# Patient Record
Sex: MALE | Race: WHITE | Employment: FULL TIME | URBAN - METROPOLITAN AREA
[De-identification: names, ages, dates, MRNs, and addresses within clinical notes are randomized per-mention and may not be internally consistent; named-entity substitution may affect disease eponyms.]

---

## 2024-06-05 ENCOUNTER — HOSPITAL ENCOUNTER (INPATIENT)
Age: 54
LOS: 2 days | Discharge: HOME | End: 2024-06-07
Attending: EMERGENCY MEDICINE | Admitting: INTERNAL MEDICINE
Payer: COMMERCIAL

## 2024-06-05 ENCOUNTER — APPOINTMENT (OUTPATIENT)
Dept: GENERAL RADIOLOGY | Age: 54
End: 2024-06-05
Payer: COMMERCIAL

## 2024-06-05 ENCOUNTER — APPOINTMENT (OUTPATIENT)
Dept: CT IMAGING | Age: 54
End: 2024-06-05
Payer: COMMERCIAL

## 2024-06-05 DIAGNOSIS — J20.9 ACUTE BRONCHITIS, UNSPECIFIED ORGANISM: Primary | ICD-10-CM

## 2024-06-05 DIAGNOSIS — R09.02 HYPOXEMIA: ICD-10-CM

## 2024-06-05 PROBLEM — J44.1 ACUTE EXACERBATION OF CHRONIC OBSTRUCTIVE PULMONARY DISEASE (COPD) (HCC): Status: ACTIVE | Noted: 2024-06-05

## 2024-06-05 LAB
ALBUMIN SERPL-MCNC: 4.9 G/DL (ref 3.4–5)
ALBUMIN/GLOB SERPL: 1.6 {RATIO} (ref 1.1–2.2)
ALP SERPL-CCNC: 71 U/L (ref 40–129)
ALT SERPL-CCNC: 24 U/L (ref 10–40)
ANION GAP SERPL CALCULATED.3IONS-SCNC: 12 MMOL/L (ref 3–16)
AST SERPL-CCNC: 23 U/L (ref 15–37)
BASE EXCESS BLDA CALC-SCNC: -2.3 MMOL/L (ref -3–3)
BASOPHILS # BLD: 0.1 K/UL (ref 0–0.2)
BASOPHILS NFR BLD: 0.5 %
BILIRUB SERPL-MCNC: 0.5 MG/DL (ref 0–1)
BUN SERPL-MCNC: 20 MG/DL (ref 7–20)
CALCIUM SERPL-MCNC: 10.2 MG/DL (ref 8.3–10.6)
CHLORIDE SERPL-SCNC: 102 MMOL/L (ref 99–110)
CO2 BLDA-SCNC: 49.7 MMOL/L
CO2 SERPL-SCNC: 26 MMOL/L (ref 21–32)
COHGB MFR BLDA: 1.6 % (ref 0–1.5)
CREAT SERPL-MCNC: 0.9 MG/DL (ref 0.9–1.3)
DEPRECATED RDW RBC AUTO: 13.6 % (ref 12.4–15.4)
EOSINOPHIL # BLD: 0.2 K/UL (ref 0–0.6)
EOSINOPHIL NFR BLD: 1.7 %
FLUAV RNA RESP QL NAA+PROBE: NOT DETECTED
FLUBV RNA RESP QL NAA+PROBE: NOT DETECTED
GFR SERPLBLD CREATININE-BSD FMLA CKD-EPI: >90 ML/MIN/{1.73_M2}
GLUCOSE SERPL-MCNC: 80 MG/DL (ref 70–99)
HCO3 BLDA-SCNC: 21.2 MMOL/L (ref 21–29)
HCT VFR BLD AUTO: 47.4 % (ref 40.5–52.5)
HGB BLD-MCNC: 16.2 G/DL (ref 13.5–17.5)
HGB BLDA-MCNC: 15.6 G/DL (ref 13.5–17.5)
INR PPP: 0.95 (ref 0.85–1.15)
LACTATE BLDV-SCNC: 1 MMOL/L (ref 0.4–1.9)
LYMPHOCYTES # BLD: 2.4 K/UL (ref 1–5.1)
LYMPHOCYTES NFR BLD: 24.8 %
MCH RBC QN AUTO: 31.8 PG (ref 26–34)
MCHC RBC AUTO-ENTMCNC: 34.1 G/DL (ref 31–36)
MCV RBC AUTO: 93.1 FL (ref 80–100)
METHGB MFR BLDA: 0.5 %
MONOCYTES # BLD: 1.1 K/UL (ref 0–1.3)
MONOCYTES NFR BLD: 11.1 %
NEUTROPHILS # BLD: 5.9 K/UL (ref 1.7–7.7)
NEUTROPHILS NFR BLD: 61.9 %
NT-PROBNP SERPL-MCNC: 136 PG/ML (ref 0–124)
O2 THERAPY: ABNORMAL
PCO2 BLDA: 32.4 MMHG (ref 35–45)
PH BLDA: 7.42 [PH] (ref 7.35–7.45)
PLATELET # BLD AUTO: 222 K/UL (ref 135–450)
PMV BLD AUTO: 8.2 FL (ref 5–10.5)
PO2 BLDA: 70.9 MMHG (ref 75–108)
POTASSIUM SERPL-SCNC: 4.1 MMOL/L (ref 3.5–5.1)
PROCALCITONIN SERPL IA-MCNC: 0.07 NG/ML (ref 0–0.15)
PROT SERPL-MCNC: 8 G/DL (ref 6.4–8.2)
PROTHROMBIN TIME: 12.9 SEC (ref 11.9–14.9)
RBC # BLD AUTO: 5.09 M/UL (ref 4.2–5.9)
SAO2 % BLDA: 95.3 %
SARS-COV-2 RNA RESP QL NAA+PROBE: NOT DETECTED
SODIUM SERPL-SCNC: 140 MMOL/L (ref 136–145)
TROPONIN, HIGH SENSITIVITY: 6 NG/L (ref 0–22)
TROPONIN, HIGH SENSITIVITY: 6 NG/L (ref 0–22)
WBC # BLD AUTO: 9.5 K/UL (ref 4–11)

## 2024-06-05 PROCEDURE — 87636 SARSCOV2 & INF A&B AMP PRB: CPT

## 2024-06-05 PROCEDURE — 96374 THER/PROPH/DIAG INJ IV PUSH: CPT

## 2024-06-05 PROCEDURE — 87040 BLOOD CULTURE FOR BACTERIA: CPT

## 2024-06-05 PROCEDURE — 85025 COMPLETE CBC W/AUTO DIFF WBC: CPT

## 2024-06-05 PROCEDURE — 94640 AIRWAY INHALATION TREATMENT: CPT

## 2024-06-05 PROCEDURE — 6370000000 HC RX 637 (ALT 250 FOR IP): Performed by: INTERNAL MEDICINE

## 2024-06-05 PROCEDURE — 2580000003 HC RX 258: Performed by: INTERNAL MEDICINE

## 2024-06-05 PROCEDURE — 1200000000 HC SEMI PRIVATE

## 2024-06-05 PROCEDURE — 2700000000 HC OXYGEN THERAPY PER DAY

## 2024-06-05 PROCEDURE — 71045 X-RAY EXAM CHEST 1 VIEW: CPT

## 2024-06-05 PROCEDURE — 85610 PROTHROMBIN TIME: CPT

## 2024-06-05 PROCEDURE — 6360000004 HC RX CONTRAST MEDICATION: Performed by: EMERGENCY MEDICINE

## 2024-06-05 PROCEDURE — 80053 COMPREHEN METABOLIC PANEL: CPT

## 2024-06-05 PROCEDURE — 83880 ASSAY OF NATRIURETIC PEPTIDE: CPT

## 2024-06-05 PROCEDURE — 6370000000 HC RX 637 (ALT 250 FOR IP): Performed by: EMERGENCY MEDICINE

## 2024-06-05 PROCEDURE — 2580000003 HC RX 258: Performed by: EMERGENCY MEDICINE

## 2024-06-05 PROCEDURE — 6360000002 HC RX W HCPCS: Performed by: INTERNAL MEDICINE

## 2024-06-05 PROCEDURE — 82803 BLOOD GASES ANY COMBINATION: CPT

## 2024-06-05 PROCEDURE — 71260 CT THORAX DX C+: CPT

## 2024-06-05 PROCEDURE — 94761 N-INVAS EAR/PLS OXIMETRY MLT: CPT

## 2024-06-05 PROCEDURE — 83605 ASSAY OF LACTIC ACID: CPT

## 2024-06-05 PROCEDURE — 36415 COLL VENOUS BLD VENIPUNCTURE: CPT

## 2024-06-05 PROCEDURE — 84145 PROCALCITONIN (PCT): CPT

## 2024-06-05 PROCEDURE — 84484 ASSAY OF TROPONIN QUANT: CPT

## 2024-06-05 PROCEDURE — 6360000002 HC RX W HCPCS: Performed by: EMERGENCY MEDICINE

## 2024-06-05 PROCEDURE — 99285 EMERGENCY DEPT VISIT HI MDM: CPT

## 2024-06-05 RX ORDER — ACETAMINOPHEN 650 MG/1
650 SUPPOSITORY RECTAL EVERY 6 HOURS PRN
Status: DISCONTINUED | OUTPATIENT
Start: 2024-06-05 | End: 2024-06-07 | Stop reason: HOSPADM

## 2024-06-05 RX ORDER — IPRATROPIUM BROMIDE AND ALBUTEROL SULFATE 2.5; .5 MG/3ML; MG/3ML
1 SOLUTION RESPIRATORY (INHALATION)
Status: DISCONTINUED | OUTPATIENT
Start: 2024-06-05 | End: 2024-06-07 | Stop reason: HOSPADM

## 2024-06-05 RX ORDER — SODIUM CHLORIDE 9 MG/ML
INJECTION, SOLUTION INTRAVENOUS PRN
Status: DISCONTINUED | OUTPATIENT
Start: 2024-06-05 | End: 2024-06-07 | Stop reason: HOSPADM

## 2024-06-05 RX ORDER — AZITHROMYCIN 250 MG/1
250 TABLET, FILM COATED ORAL DAILY
Status: DISCONTINUED | OUTPATIENT
Start: 2024-06-05 | End: 2024-06-07 | Stop reason: HOSPADM

## 2024-06-05 RX ORDER — IPRATROPIUM BROMIDE AND ALBUTEROL SULFATE 2.5; .5 MG/3ML; MG/3ML
1 SOLUTION RESPIRATORY (INHALATION)
Status: DISCONTINUED | OUTPATIENT
Start: 2024-06-05 | End: 2024-06-05

## 2024-06-05 RX ORDER — POLYETHYLENE GLYCOL 3350 17 G/17G
17 POWDER, FOR SOLUTION ORAL DAILY PRN
Status: DISCONTINUED | OUTPATIENT
Start: 2024-06-05 | End: 2024-06-07 | Stop reason: HOSPADM

## 2024-06-05 RX ORDER — DOXYCYCLINE HYCLATE 100 MG
100 TABLET ORAL DAILY
Status: ON HOLD | COMMUNITY
End: 2024-06-07 | Stop reason: HOSPADM

## 2024-06-05 RX ORDER — SILDENAFIL 100 MG/1
100 TABLET, FILM COATED ORAL PRN
COMMUNITY

## 2024-06-05 RX ORDER — SODIUM CHLORIDE 0.9 % (FLUSH) 0.9 %
5-40 SYRINGE (ML) INJECTION PRN
Status: DISCONTINUED | OUTPATIENT
Start: 2024-06-05 | End: 2024-06-07 | Stop reason: HOSPADM

## 2024-06-05 RX ORDER — IPRATROPIUM BROMIDE AND ALBUTEROL SULFATE 2.5; .5 MG/3ML; MG/3ML
3 SOLUTION RESPIRATORY (INHALATION) ONCE
Status: COMPLETED | OUTPATIENT
Start: 2024-06-05 | End: 2024-06-05

## 2024-06-05 RX ORDER — ENOXAPARIN SODIUM 100 MG/ML
40 INJECTION SUBCUTANEOUS DAILY
Status: DISCONTINUED | OUTPATIENT
Start: 2024-06-05 | End: 2024-06-07 | Stop reason: HOSPADM

## 2024-06-05 RX ORDER — SODIUM CHLORIDE 0.9 % (FLUSH) 0.9 %
5-40 SYRINGE (ML) INJECTION EVERY 12 HOURS SCHEDULED
Status: DISCONTINUED | OUTPATIENT
Start: 2024-06-05 | End: 2024-06-07 | Stop reason: HOSPADM

## 2024-06-05 RX ORDER — ACETAMINOPHEN 325 MG/1
650 TABLET ORAL EVERY 6 HOURS PRN
Status: DISCONTINUED | OUTPATIENT
Start: 2024-06-05 | End: 2024-06-07 | Stop reason: HOSPADM

## 2024-06-05 RX ORDER — NICOTINE 21 MG/24HR
1 PATCH, TRANSDERMAL 24 HOURS TRANSDERMAL DAILY
Status: DISCONTINUED | OUTPATIENT
Start: 2024-06-05 | End: 2024-06-07 | Stop reason: HOSPADM

## 2024-06-05 RX ADMIN — IPRATROPIUM BROMIDE AND ALBUTEROL SULFATE 1 DOSE: .5; 3 SOLUTION RESPIRATORY (INHALATION) at 19:41

## 2024-06-05 RX ADMIN — IPRATROPIUM BROMIDE AND ALBUTEROL SULFATE 3 DOSE: .5; 3 SOLUTION RESPIRATORY (INHALATION) at 10:23

## 2024-06-05 RX ADMIN — WATER 125 MG: 1 INJECTION INTRAMUSCULAR; INTRAVENOUS; SUBCUTANEOUS at 09:24

## 2024-06-05 RX ADMIN — WATER 40 MG: 1 INJECTION INTRAMUSCULAR; INTRAVENOUS; SUBCUTANEOUS at 14:43

## 2024-06-05 RX ADMIN — AZITHROMYCIN DIHYDRATE 250 MG: 250 TABLET ORAL at 14:43

## 2024-06-05 RX ADMIN — IPRATROPIUM BROMIDE AND ALBUTEROL SULFATE 1 DOSE: .5; 3 SOLUTION RESPIRATORY (INHALATION) at 16:28

## 2024-06-05 RX ADMIN — Medication 2 PUFF: at 19:41

## 2024-06-05 RX ADMIN — SODIUM CHLORIDE, PRESERVATIVE FREE 10 ML: 5 INJECTION INTRAVENOUS at 22:47

## 2024-06-05 RX ADMIN — IOPAMIDOL 75 ML: 755 INJECTION, SOLUTION INTRAVENOUS at 11:03

## 2024-06-05 RX ADMIN — ENOXAPARIN SODIUM 40 MG: 100 INJECTION SUBCUTANEOUS at 14:43

## 2024-06-05 ASSESSMENT — LIFESTYLE VARIABLES
HOW MANY STANDARD DRINKS CONTAINING ALCOHOL DO YOU HAVE ON A TYPICAL DAY: 3 OR 4
HOW OFTEN DO YOU HAVE A DRINK CONTAINING ALCOHOL: 2-3 TIMES A WEEK

## 2024-06-05 NOTE — PROGRESS NOTES
Patient seen in ED, room 20.  Admission completed with the following exceptions:  4 Eyes Assessment, Immunizations, Vaccines, Rights and Responsibilities, Orientation to room, Plan of Care, Education/Learning Assessment and Education Plan, white board, height and weight, pain assessment and head to toe assessment.  Patient is alert and oriented X 4.  Patient lives in a split level house with his wife and a friend and is being admitted for cough.  Home Medications as well as Outside Sources have been verbally reviewed with patient and updated if appropriate.  Medication reconciliation is now Completed.  All questions answered.    Spiritual Care consult entered as patient would like information on Advance Directives.

## 2024-06-05 NOTE — CARE COORDINATION
Discharge Planning Note:    Chart reviewed and it appears that patient has minimal needs for discharge at this time.     Primary Care Physician is Willard Gonzales MD    Primary insurance is [employer]     Please notify case management if any discharge needs are identified.      Case management will continue to follow progress and update discharge plan as needed.     Electronically signed by CORIE Velez on 6/5/2024 at 2:49 PM

## 2024-06-05 NOTE — PROGRESS NOTES
Patient admitted for Respiratory distress due to a Respiratory illness, aerosolized medication frequency changed to Q4H x 24 hours, patient to be reassessed the next day.   Electronically signed by JULIEN Rodriguez RCP on 6/5/24 at 4:32 PM EDT

## 2024-06-05 NOTE — ACP (ADVANCE CARE PLANNING)
Advanced Care Planning Note.    Purpose of Encounter: Advanced care planning in light of hospitalization  Parties In Attendance: Patient,    Decisional Capacity: Yes  Subjective: Patient  understand that this conversation is to address long term care goal  Objective: Patient admitted to the hospital with acute COPD exacerbation  Goals of Care Determination: Patient would pursue CPR and Intubation if required. No tracheostomy or long term ventilation if required.     Code Status: full code  Time spent on Advanced care Plannin minutes  Advanced Care Planning Documents: documented patient's wishes, would like Wife Agustín to make medical decisions if unable to make decisions    Elisa Rinaldi MD  2024 1:50 PM

## 2024-06-05 NOTE — ED NOTES
How does patient ambulate?   [x]Low Fall Risk (ambulates by themselves without support)  []Stand by assist   []Contact Guard   []Front wheel walker  []Wheelchair   []Steady  []Bed bound  []History of Lower Extremity Amputation  []Unknown, did not assess in the emergency department   How does patient take pills?  [x]Whole with Water  []Crushed in applesauce  []Crushed in pudding  []Other  []Unknown no oral medications were given in the ED  Is patient alert?   [x]Alert  []Drowsy but responds to voice  []Doesn't respond to voice but responds to painful stimuli  []Unresponsive  Is patient oriented?   [x]To person  [x]To place  [x]To time  [x]To situation  []Confused  []Agitated  [x]Follows commands  If patient is disoriented or from a Skill Nursing Facility has family been notified of admission?   []Yes   []No  NA  Patient belongings?   []Cell phone  []Wallet   []Dentures  []Clothing  Any specific patient or family belongings/needs/dynamics?   -  Miscellaneous comments/pending orders?  All ED orders completed.      If there are any additional questions please reach out to the Emergency Department.

## 2024-06-05 NOTE — ED PROVIDER NOTES
limited to: acute coronary syndrome, pulmonary embolism, COPD/asthma, pneumonia, sepsis, pericardial tamponade, pneumothorax, CHF, thoracic aortic dissection, anxiety    The patient's ED workup was notable for concern for acute hypoxic respiratory failure, stable on nasal cannula oxygen.  Infectious causes were highest on the differential based on his presentation.  COVID and flu are both negative, no leukocytosis or fever, procalcitonin is low, chest x-ray shows no infiltrate and lactate is normal.  The patient does not meet sepsis criteria for these reasons and does not have a definitive bacterial infection so antibiotics are deferred.  Subsequent CT chest was obtained given dyspnea out of proportion to chest x-ray and other findings and this ruled out PE or confluent infiltrate or edema.  As such we will treat as bronchitis likely attributable to his heavy smoking history although has never been formally diagnosed with COPD.  He did receive multiple breathing treatments and IV Solu-Medrol with some improvement and stable on nasal cannula oxygen.  Will admit due to new oxygen requirement.    Is this patient to be included in the SEP-1 Core Measure?  No   Exclusion criteria - the patient is NOT to be included for SEP-1 Core Measure due to:  Viral etiology found or highly suspected (including COVID-19) without concomitant bacterial infection      Consults:  Hospitalist service consulted for admission    History obtained from: Patient    Pertinent social determinants of health: None applicable    Chronic conditions potentially affecting care:  smoker    Review of other records:  None    Reassessment:  See MDM for details of medications given and reassessment    During the patient's ED course, the patient was given:  Medications   azithromycin (ZITHROMAX) tablet 250 mg (has no administration in time range)   methylPREDNISolone sodium succ (SOLU-MEDROL) 125 mg in sterile water 2 mL injection (125 mg IntraVENous Given  6/5/24 6268)   ipratropium 0.5 mg-albuterol 2.5 mg (DUONEB) nebulizer solution 3 Dose (3 Doses Inhalation Given 6/5/24 1023)   iopamidol (ISOVUE-370) 76 % injection 75 mL (75 mLs IntraVENous Given 6/5/24 1103)        CLINICAL IMPRESSION  1. Acute bronchitis, unspecified organism    2. Hypoxemia        Blood pressure 133/72, pulse 84, temperature 97.8 °F (36.6 °C), temperature source Oral, resp. rate 15, height 1.778 m (5' 10\"), weight 79.2 kg (174 lb 11.2 oz), SpO2 92 %.    DISPOSITION  Kenny Lanier was admitted in fair condition.    The plan is to admit to the hospital at this time under the hospitalist service.  Hospitalist accepted the patient and will take over the patient's care.    Critical care time:  The total critical care time I independently spent while evaluating and treating this patient was 34 minutes.  This excludes time spent doing separately billable procedures.  This includes time at the bedside, data interpretation, medication management, obtaining critical history from collateral sources if the patient is unable to provide it directly, and physician consultation.  Specifics of interventions taken and potentially life-threatening diagnostic considerations are listed above in the medical decision making.  If this was a shared visit with an CROW, the time in this attestation is non-concurrent critical care time out of the total shared critical care time provided by the CROW and myself.    DISCLAIMER: This chart was created using Dragon dictation software.  Efforts were made by me to ensure accuracy, however some errors may be present due to limitations of this technology and occasionally words are not transcribed correctly.        Braulio De La Vega MD  06/05/24 6731

## 2024-06-05 NOTE — H&P
HOSPITALISTS HISTORY AND PHYSICAL    6/5/2024 1:48 PM    Patient Information:  TERENCE LANIER is a 53 y.o. male [id number]  PCP:  Willard Gonzales MD (Tel: [phone] )    Chief complaint:    Chief Complaint   Patient presents with    Cough     Patient states chest cold and cough since Friday, states productive cough, 89-90% on RA at time of triage, feels like he's getting worse.        History of Present Illness:  Terence Lanier is a 53 y.o. male sent with cooperative green sputum along with chills and sweats no fever is also having shortness of breath thus came to the ED denies any sick contacts nausea vomiting or diarrhea patient does smoke 1 pack/day and drinks on the weekends does not have a diagnosis of COPD or asthma in the past  REVIEW OF SYSTEMS:   ENT: Negative for rhinorrhea, epistaxis, hoarseness, sore throat.     Gastrointestinal: Negative for nausea, vomiting, diarrhea  Genitourinary: Negative for polyuria, dysuria   Hematologic/Lymphatic: Negative for bleeding tendency, easy bruising  Musculoskeletal: Negative for myalgias and arthralgias  Neurologic: Negative for confusion,dysarthria.  Skin: Negative for itching,rash  Psychiatric: Negative for depression,anxiety, agitation.  Endocrine: Negative for polydipsia,polyuria,heat /cold intolerance.    Past Medical History:   has a past medical history of Acne rosacea and Depression.     Past Surgical History:   has no past surgical history on file.     Medications:  No current facility-administered medications on file prior to encounter.     Current Outpatient Medications on File Prior to Encounter   Medication Sig Dispense Refill    doxycycline hyclate (VIBRA-TABS) 100 MG tablet Take 1 tablet by mouth daily      sildenafil (VIAGRA) 100 MG tablet Take 1 tablet by mouth as needed for Erectile Dysfunction      cephALEXin (KEFLEX) 500 MG capsule Take 1 capsule by mouth 4  times daily (Patient not taking: Reported on 6/5/2024) 40 capsule 0       Allergies:  No Known Allergies     Social History:  Patient Lives att home   reports that he has been smoking cigarettes. He has never used smokeless tobacco. He reports current alcohol use. He reports current drug use. Frequency: 2.00 times per week. Drug: Marijuana (Weed).     Family History:  family history includes Cancer in his father and mother. ,     Physical Exam:  /72   Pulse 84   Temp 97.8 °F (36.6 °C) (Oral)   Resp 15   Ht 1.778 m (5' 10\")   Wt 79.2 kg (174 lb 11.2 oz)   SpO2 92%   BMI 25.07 kg/m²     General appearance:  Appears comfortable. AAOx3  HEENT: atraumatic, Pupils equal, muscous membranes moist, no masses appreciated  Cardiovascular: Regular rate and rhythm no murmurs appreciated  Respiratory:+ wheezing  Gastrointestinal: Abdomen soft, non-tender, BS+  EXT: no edema  Neurology: no gross focal deficts  Psychiatry: Appropriate affect. Not agitated  Skin: Warm, dry, no rashes appreciated    Labs:  CBC:   Lab Results   Component Value Date/Time    WBC 9.5 06/05/2024 09:28 AM    RBC 5.09 06/05/2024 09:28 AM    HGB 16.2 06/05/2024 09:28 AM    HCT 47.4 06/05/2024 09:28 AM    MCV 93.1 06/05/2024 09:28 AM    MCH 31.8 06/05/2024 09:28 AM    MCHC 34.1 06/05/2024 09:28 AM    RDW 13.6 06/05/2024 09:28 AM     06/05/2024 09:28 AM    MPV 8.2 06/05/2024 09:28 AM     BMP:    Lab Results   Component Value Date/Time     06/05/2024 09:28 AM    K 4.1 06/05/2024 09:28 AM     06/05/2024 09:28 AM    CO2 26 06/05/2024 09:28 AM    BUN 20 06/05/2024 09:28 AM    CREATININE 0.9 06/05/2024 09:28 AM    CALCIUM 10.2 06/05/2024 09:28 AM    LABGLOM >90 06/05/2024 09:28 AM    GLUCOSE 80 06/05/2024 09:28 AM     CT CHEST PULMONARY EMBOLISM W CONTRAST   Final Result   No evidence of pulmonary embolism or acute pulmonary abnormality.         XR CHEST PORTABLE   Final Result   No acute cardiopulmonary process.             Recent

## 2024-06-05 NOTE — PROGRESS NOTES
4 Eyes Skin Assessment     NAME:  Kenny Lanier  YOB: 1970  MEDICAL RECORD NUMBER:  [id number]    The patient is being assessed for  Admission    I agree that at least one RN has performed a thorough Head to Toe Skin Assessment on the patient. ALL assessment sites listed below have been assessed.      Areas assessed by both nurses:    Head, Face, Ears, Shoulders, Back, Chest, Arms, Elbows, Hands, Sacrum. Buttock, Coccyx, Ischium, and Legs. Feet and Heels        Does the Patient have a Wound? No noted wound(s)       Rahul Prevention initiated by RN: No  Wound Care Orders initiated by RN: No    Pressure Injury (Stage 3,4, Unstageable, DTI, NWPT, and Complex wounds) if present, place Wound referral order by RN under : No    New Ostomies, if present place, Ostomy referral order under : No     Nurse 1 eSignature: Electronically signed by Brandon Mulligan RN on 6/5/24 at 2:36 PM EDT    **SHARE this note so that the co-signing nurse can place an eSignature**    Nurse 2 eSignature: Electronically signed by Minor Johnston RN on 6/5/24 at 3:09 PM EDT

## 2024-06-06 LAB
ANION GAP SERPL CALCULATED.3IONS-SCNC: 12 MMOL/L (ref 3–16)
BASOPHILS # BLD: 0 K/UL (ref 0–0.2)
BASOPHILS NFR BLD: 0 %
BUN SERPL-MCNC: 19 MG/DL (ref 7–20)
CALCIUM SERPL-MCNC: 9.9 MG/DL (ref 8.3–10.6)
CHLORIDE SERPL-SCNC: 101 MMOL/L (ref 99–110)
CO2 SERPL-SCNC: 23 MMOL/L (ref 21–32)
CREAT SERPL-MCNC: 0.7 MG/DL (ref 0.9–1.3)
DEPRECATED RDW RBC AUTO: 13.9 % (ref 12.4–15.4)
EOSINOPHIL # BLD: 0 K/UL (ref 0–0.6)
EOSINOPHIL NFR BLD: 0 %
GFR SERPLBLD CREATININE-BSD FMLA CKD-EPI: >90 ML/MIN/{1.73_M2}
GLUCOSE SERPL-MCNC: 148 MG/DL (ref 70–99)
HCT VFR BLD AUTO: 42.9 % (ref 40.5–52.5)
HGB BLD-MCNC: 14.5 G/DL (ref 13.5–17.5)
LYMPHOCYTES # BLD: 1.1 K/UL (ref 1–5.1)
LYMPHOCYTES NFR BLD: 8.8 %
MCH RBC QN AUTO: 31.5 PG (ref 26–34)
MCHC RBC AUTO-ENTMCNC: 33.8 G/DL (ref 31–36)
MCV RBC AUTO: 93.1 FL (ref 80–100)
MONOCYTES # BLD: 0.9 K/UL (ref 0–1.3)
MONOCYTES NFR BLD: 7.3 %
NEUTROPHILS # BLD: 10.3 K/UL (ref 1.7–7.7)
NEUTROPHILS NFR BLD: 83.9 %
PLATELET # BLD AUTO: 231 K/UL (ref 135–450)
PMV BLD AUTO: 8.5 FL (ref 5–10.5)
POTASSIUM SERPL-SCNC: 3.8 MMOL/L (ref 3.5–5.1)
RBC # BLD AUTO: 4.61 M/UL (ref 4.2–5.9)
SODIUM SERPL-SCNC: 136 MMOL/L (ref 136–145)
WBC # BLD AUTO: 12.3 K/UL (ref 4–11)

## 2024-06-06 PROCEDURE — 1200000000 HC SEMI PRIVATE

## 2024-06-06 PROCEDURE — 6370000000 HC RX 637 (ALT 250 FOR IP): Performed by: INTERNAL MEDICINE

## 2024-06-06 PROCEDURE — 36415 COLL VENOUS BLD VENIPUNCTURE: CPT

## 2024-06-06 PROCEDURE — 85025 COMPLETE CBC W/AUTO DIFF WBC: CPT

## 2024-06-06 PROCEDURE — 94640 AIRWAY INHALATION TREATMENT: CPT

## 2024-06-06 PROCEDURE — 6360000002 HC RX W HCPCS: Performed by: INTERNAL MEDICINE

## 2024-06-06 PROCEDURE — 94761 N-INVAS EAR/PLS OXIMETRY MLT: CPT

## 2024-06-06 PROCEDURE — 2580000003 HC RX 258: Performed by: INTERNAL MEDICINE

## 2024-06-06 PROCEDURE — 80048 BASIC METABOLIC PNL TOTAL CA: CPT

## 2024-06-06 RX ADMIN — Medication 2 PUFF: at 19:41

## 2024-06-06 RX ADMIN — IPRATROPIUM BROMIDE AND ALBUTEROL SULFATE 1 DOSE: .5; 3 SOLUTION RESPIRATORY (INHALATION) at 16:05

## 2024-06-06 RX ADMIN — IPRATROPIUM BROMIDE AND ALBUTEROL SULFATE 1 DOSE: .5; 3 SOLUTION RESPIRATORY (INHALATION) at 07:38

## 2024-06-06 RX ADMIN — IPRATROPIUM BROMIDE AND ALBUTEROL SULFATE 1 DOSE: .5; 3 SOLUTION RESPIRATORY (INHALATION) at 04:13

## 2024-06-06 RX ADMIN — AZITHROMYCIN DIHYDRATE 250 MG: 250 TABLET ORAL at 08:52

## 2024-06-06 RX ADMIN — IPRATROPIUM BROMIDE AND ALBUTEROL SULFATE 1 DOSE: .5; 3 SOLUTION RESPIRATORY (INHALATION) at 19:41

## 2024-06-06 RX ADMIN — IPRATROPIUM BROMIDE AND ALBUTEROL SULFATE 1 DOSE: .5; 3 SOLUTION RESPIRATORY (INHALATION) at 12:11

## 2024-06-06 RX ADMIN — SODIUM CHLORIDE, PRESERVATIVE FREE 10 ML: 5 INJECTION INTRAVENOUS at 19:41

## 2024-06-06 RX ADMIN — Medication 2 PUFF: at 07:39

## 2024-06-06 RX ADMIN — WATER 40 MG: 1 INJECTION INTRAMUSCULAR; INTRAVENOUS; SUBCUTANEOUS at 08:52

## 2024-06-06 RX ADMIN — IPRATROPIUM BROMIDE AND ALBUTEROL SULFATE 1 DOSE: .5; 3 SOLUTION RESPIRATORY (INHALATION) at 00:53

## 2024-06-06 ASSESSMENT — PAIN SCALES - GENERAL: PAINLEVEL_OUTOF10: 0

## 2024-06-06 NOTE — PLAN OF CARE
Problem: Discharge Planning  Goal: Discharge to home or other facility with appropriate resources  6/6/2024 1536 by Liberty Henao RN  Outcome: Progressing  6/6/2024 1101 by Liberty Henao RN  Outcome: Progressing  6/6/2024 0529 by aFllon Goodson RN  Outcome: Progressing     Problem: Respiratory - Adult  Goal: Achieves optimal ventilation and oxygenation  6/6/2024 1536 by Liberty Henao RN  Outcome: Progressing  6/6/2024 1101 by Liberty Henao RN  Outcome: Progressing  6/6/2024 0529 by Fallon Goodson RN  Outcome: Progressing     Problem: Infection - Adult  Goal: Absence of infection at discharge  6/6/2024 1536 by Liberty Henao RN  Outcome: Progressing  6/6/2024 1101 by Liberty Henao RN  Outcome: Progressing  6/6/2024 0529 by Fallon Goodson RN  Outcome: Progressing  Goal: Absence of infection during hospitalization  6/6/2024 1536 by Liberty Henao RN  Outcome: Progressing  6/6/2024 1101 by Liberty Henao RN  Outcome: Progressing  6/6/2024 0529 by Fallon Goodson RN  Outcome: Progressing

## 2024-06-06 NOTE — PLAN OF CARE
Problem: Discharge Planning  Goal: Discharge to home or other facility with appropriate resources  Outcome: Progressing     Problem: Respiratory - Adult  Goal: Achieves optimal ventilation and oxygenation  Outcome: Progressing     Problem: Infection - Adult  Goal: Absence of infection at discharge  Outcome: Progressing  Goal: Absence of infection during hospitalization  Outcome: Progressing

## 2024-06-06 NOTE — PROGRESS NOTES
[facility]    6/6/2024 1:53 PM        Name: Kenny Lanier .              Admitted: 6/5/2024  Primary Care Provider: Willard Gonzales MD (Tel: [phone])        Subjective:      Bed still having a slight cough and wheezing which is improving no chest pain    Reviewed interval ancillary notes    Current Medications  sodium chloride flush 0.9 % injection 5-40 mL, 2 times per day  sodium chloride flush 0.9 % injection 5-40 mL, PRN  0.9 % sodium chloride infusion, PRN  enoxaparin (LOVENOX) injection 40 mg, Daily  polyethylene glycol (GLYCOLAX) packet 17 g, Daily PRN  acetaminophen (TYLENOL) tablet 650 mg, Q6H PRN   Or  acetaminophen (TYLENOL) suppository 650 mg, Q6H PRN  methylPREDNISolone sodium succ (SOLU-MEDROL) 40 mg in sterile water 1 mL injection, Daily  nicotine (NICODERM CQ) 14 MG/24HR 1 patch, Daily  mometasone-formoterol (DULERA) 100-5 MCG/ACT inhaler 2 puff, BID RT  azithromycin (ZITHROMAX) tablet 250 mg, Daily  ipratropium 0.5 mg-albuterol 2.5 mg (DUONEB) nebulizer solution 1 Dose, Q4H RT        Objective:  /62   Pulse 91   Temp 98 °F (36.7 °C) (Oral)   Resp 16   Ht 1.778 m (5' 10\")   Wt 76.2 kg (167 lb 14.4 oz)   SpO2 95%   BMI 24.09 kg/m²     Intake/Output Summary (Last 24 hours) at 6/6/2024 1353  Last data filed at 6/6/2024 1251  Gross per 24 hour   Intake 480 ml   Output --   Net 480 ml      Wt Readings from Last 3 Encounters:   06/06/24 76.2 kg (167 lb 14.4 oz)   05/15/16 72.6 kg (160 lb)       General appearance:  Appears comfortable. AAOx3  HEENT: atraumatic, Pupils equal, muscous membranes moist, no masses appreciated  Cardiovascular: Regular rate and rhythm no murmurs appreciated  Respiratory: Wheezing is improving  Gastrointestinal: Abdomen soft, non-tender, BS+  EXT: no edema  Neurology: no gross focal deficts  Psychiatry: Appropriate affect. Not agitated  Skin: Warm, dry, no rashes

## 2024-06-06 NOTE — PLAN OF CARE
Problem: Discharge Planning  Goal: Discharge to home or other facility with appropriate resources  6/6/2024 1101 by Liberty Henao RN  Outcome: Progressing  6/6/2024 0529 by Fallon Goodson RN  Outcome: Progressing     Problem: Respiratory - Adult  Goal: Achieves optimal ventilation and oxygenation  6/6/2024 1101 by Liberty Henao RN  Outcome: Progressing  6/6/2024 0529 by Fallon Goodson RN  Outcome: Progressing     Problem: Infection - Adult  Goal: Absence of infection at discharge  6/6/2024 1101 by Liberty Henao RN  Outcome: Progressing  6/6/2024 0529 by Fallon Goodson RN  Outcome: Progressing  Goal: Absence of infection during hospitalization  6/6/2024 1101 by Liberty Henao RN  Outcome: Progressing  6/6/2024 0529 by Fallon Goodson RN  Outcome: Progressing

## 2024-06-06 NOTE — ACP (ADVANCE CARE PLANNING)
Advance Care Planning     Advance Care Planning Inpatient Note  Spiritual Care Department    Today's Date: 6/6/2024  [facility]    Received request from IDT Member, Jazzy Mike RN.  Upon review of chart and communication with care team, patient's decision making abilities are not in question.. Patient was/were present in the room during visit.    Goals of ACP Conversation:  Discuss advance care planning documents  Facilitate a discussion related to patient's goals of care as they align with the patient's values and beliefs.    Health Care Decision Makers:     No healthcare decision makers have been documented.  Click here to complete HealthCare Decision Makers including selection of the Healthcare Decision Maker Relationship (ie \"Primary\")  Summary:  No Decision Maker named by patient at this time    Advance Care Planning Documents (Patient Wishes):  None     Assessment:  Spiritual Care consult to facilitate completion of Health Care Power of  and Living Will. Pt was awake and alert. Pt was given blank copies of POA/LW documents to review /complete when appropriate. Pt will notify the nurse to contact Spiritual Care Services  to witness pt sine complete POA/LW documents.    Provided support though active listening, affirmed feelings,thoughts conversation concerns and blessing.     Interventions:  Provided education on documents for clarity and greater understanding  Discussed and provided education on state decision maker hierarchy  Encouraged ongoing ACP conversation with future decision makers and loved ones  Reviewed but did not complete ACP document    Care Preferences Communicated:   No    Outcomes/Plan:  ACP Discussion: Postponed. Pt will notify the nurse when appropriate.    Electronically signed by Chaplain Domitila on 6/6/2024 at 11:40 AM

## 2024-06-07 VITALS
WEIGHT: 170.7 LBS | SYSTOLIC BLOOD PRESSURE: 130 MMHG | RESPIRATION RATE: 18 BRPM | DIASTOLIC BLOOD PRESSURE: 72 MMHG | BODY MASS INDEX: 24.44 KG/M2 | HEIGHT: 70 IN | HEART RATE: 78 BPM | OXYGEN SATURATION: 97 % | TEMPERATURE: 97.7 F

## 2024-06-07 LAB
ANION GAP SERPL CALCULATED.3IONS-SCNC: 13 MMOL/L (ref 3–16)
BASOPHILS # BLD: 0 K/UL (ref 0–0.2)
BASOPHILS NFR BLD: 0.2 %
BUN SERPL-MCNC: 18 MG/DL (ref 7–20)
CALCIUM SERPL-MCNC: 9.7 MG/DL (ref 8.3–10.6)
CHLORIDE SERPL-SCNC: 103 MMOL/L (ref 99–110)
CO2 SERPL-SCNC: 23 MMOL/L (ref 21–32)
CREAT SERPL-MCNC: 0.8 MG/DL (ref 0.9–1.3)
DEPRECATED RDW RBC AUTO: 13.9 % (ref 12.4–15.4)
EOSINOPHIL # BLD: 0 K/UL (ref 0–0.6)
EOSINOPHIL NFR BLD: 0.1 %
GFR SERPLBLD CREATININE-BSD FMLA CKD-EPI: >90 ML/MIN/{1.73_M2}
GLUCOSE SERPL-MCNC: 102 MG/DL (ref 70–99)
HCT VFR BLD AUTO: 42.5 % (ref 40.5–52.5)
HGB BLD-MCNC: 14.8 G/DL (ref 13.5–17.5)
LYMPHOCYTES # BLD: 2.4 K/UL (ref 1–5.1)
LYMPHOCYTES NFR BLD: 21.3 %
MCH RBC QN AUTO: 32.3 PG (ref 26–34)
MCHC RBC AUTO-ENTMCNC: 34.7 G/DL (ref 31–36)
MCV RBC AUTO: 93.2 FL (ref 80–100)
MONOCYTES # BLD: 1 K/UL (ref 0–1.3)
MONOCYTES NFR BLD: 8.5 %
NEUTROPHILS # BLD: 7.9 K/UL (ref 1.7–7.7)
NEUTROPHILS NFR BLD: 69.9 %
PLATELET # BLD AUTO: 203 K/UL (ref 135–450)
PMV BLD AUTO: 8.1 FL (ref 5–10.5)
POTASSIUM SERPL-SCNC: 3.7 MMOL/L (ref 3.5–5.1)
RBC # BLD AUTO: 4.57 M/UL (ref 4.2–5.9)
SODIUM SERPL-SCNC: 139 MMOL/L (ref 136–145)
WBC # BLD AUTO: 11.3 K/UL (ref 4–11)

## 2024-06-07 PROCEDURE — 85025 COMPLETE CBC W/AUTO DIFF WBC: CPT

## 2024-06-07 PROCEDURE — 6370000000 HC RX 637 (ALT 250 FOR IP): Performed by: INTERNAL MEDICINE

## 2024-06-07 PROCEDURE — 94640 AIRWAY INHALATION TREATMENT: CPT

## 2024-06-07 PROCEDURE — 6360000002 HC RX W HCPCS: Performed by: INTERNAL MEDICINE

## 2024-06-07 PROCEDURE — 80048 BASIC METABOLIC PNL TOTAL CA: CPT

## 2024-06-07 PROCEDURE — 94761 N-INVAS EAR/PLS OXIMETRY MLT: CPT

## 2024-06-07 PROCEDURE — 36415 COLL VENOUS BLD VENIPUNCTURE: CPT

## 2024-06-07 PROCEDURE — 2580000003 HC RX 258: Performed by: INTERNAL MEDICINE

## 2024-06-07 RX ORDER — ALBUTEROL SULFATE 90 UG/1
2 AEROSOL, METERED RESPIRATORY (INHALATION) 4 TIMES DAILY PRN
Qty: 18 G | Refills: 0 | Status: SHIPPED | OUTPATIENT
Start: 2024-06-07

## 2024-06-07 RX ORDER — AZITHROMYCIN 250 MG/1
250 TABLET, FILM COATED ORAL DAILY
Qty: 3 TABLET | Refills: 0 | Status: SHIPPED | OUTPATIENT
Start: 2024-06-08 | End: 2024-06-11

## 2024-06-07 RX ORDER — PREDNISONE 20 MG/1
40 TABLET ORAL DAILY
Qty: 6 TABLET | Refills: 0 | Status: SHIPPED | OUTPATIENT
Start: 2024-06-07 | End: 2024-06-10

## 2024-06-07 RX ORDER — NICOTINE 21 MG/24HR
1 PATCH, TRANSDERMAL 24 HOURS TRANSDERMAL DAILY
Qty: 14 PATCH | Refills: 0 | Status: SHIPPED | OUTPATIENT
Start: 2024-06-08 | End: 2024-06-22

## 2024-06-07 RX ADMIN — ENOXAPARIN SODIUM 40 MG: 100 INJECTION SUBCUTANEOUS at 08:30

## 2024-06-07 RX ADMIN — WATER 40 MG: 1 INJECTION INTRAMUSCULAR; INTRAVENOUS; SUBCUTANEOUS at 08:29

## 2024-06-07 RX ADMIN — IPRATROPIUM BROMIDE AND ALBUTEROL SULFATE 1 DOSE: .5; 3 SOLUTION RESPIRATORY (INHALATION) at 04:14

## 2024-06-07 RX ADMIN — Medication 2 PUFF: at 07:21

## 2024-06-07 RX ADMIN — IPRATROPIUM BROMIDE AND ALBUTEROL SULFATE 1 DOSE: .5; 3 SOLUTION RESPIRATORY (INHALATION) at 00:28

## 2024-06-07 RX ADMIN — AZITHROMYCIN DIHYDRATE 250 MG: 250 TABLET ORAL at 08:29

## 2024-06-07 RX ADMIN — IPRATROPIUM BROMIDE AND ALBUTEROL SULFATE 1 DOSE: .5; 3 SOLUTION RESPIRATORY (INHALATION) at 11:15

## 2024-06-07 RX ADMIN — IPRATROPIUM BROMIDE AND ALBUTEROL SULFATE 1 DOSE: .5; 3 SOLUTION RESPIRATORY (INHALATION) at 07:20

## 2024-06-07 RX ADMIN — SODIUM CHLORIDE, PRESERVATIVE FREE 10 ML: 5 INJECTION INTRAVENOUS at 08:30

## 2024-06-07 NOTE — PLAN OF CARE
Problem: Discharge Planning  Goal: Discharge to home or other facility with appropriate resources  Outcome: Progressing     Problem: Respiratory - Adult  Goal: Achieves optimal ventilation and oxygenation  Outcome: Progressing     Problem: Safety - Adult  Goal: Free from fall injury  Outcome: Progressing

## 2024-06-07 NOTE — PROGRESS NOTES
Shift assessment completed, scheduled medications administered. Pt A&Ox4, VSS. Pt denies pain or nausea, tolerating diet well. Pt ambulating independently in room, anticipating discharge home today. Will continue to monitor for changes.

## 2024-06-07 NOTE — PROGRESS NOTES
CLINICAL PHARMACY NOTE: MEDS TO BEDS    Total # of Prescriptions Filled: 4   The following medications were delivered to the patient:  Albuterol inhaler  Azithromycin 250mg  Prednisone 20mg  Nicotine 14mg patches    Additional Documentation:     Medications were picked up in the Outpatient Pharmacy by patient's daughter Satinder Yuen Kishor Maya

## 2024-06-07 NOTE — PLAN OF CARE
Problem: Respiratory - Adult  Goal: Achieves optimal ventilation and oxygenation  6/6/2024 2103 by Gogo Hardy RN  Outcome: Progressing  6/6/2024 1536 by Liberty Henao RN  Outcome: Progressing  6/6/2024 1101 by Liberty Henao RN  Outcome: Progressing     Problem: Infection - Adult  Goal: Absence of infection at discharge  6/6/2024 2103 by Gogo Hardy RN  Outcome: Progressing  6/6/2024 1536 by Liberty Henao RN  Outcome: Progressing  6/6/2024 1101 by Liberty Henao RN  Outcome: Progressing     Problem: Infection - Adult  Goal: Absence of infection during hospitalization  6/6/2024 2103 by Gogo Hardy RN  Outcome: Progressing  6/6/2024 1536 by Liberty Henao RN  Outcome: Progressing  6/6/2024 1101 by Liberty Henao RN  Outcome: Progressing     Problem: Discharge Planning  Goal: Discharge to home or other facility with appropriate resources  6/6/2024 2103 by Gogo Hardy RN  Outcome: Progressing  6/6/2024 1536 by Liberty Henao RN  Outcome: Progressing  6/6/2024 1101 by Liberty Henao RN  Outcome: Progressing

## 2024-06-07 NOTE — DISCHARGE SUMMARY
Disp-18 g, R-0Normal      predniSONE (DELTASONE) 20 MG tablet Take 2 tablets by mouth daily for 3 days, Disp-6 tablet, R-0Normal           Discharge Medication List as of 6/7/2024  1:37 PM        Discharge Medication List as of 6/7/2024  1:37 PM        CONTINUE these medications which have NOT CHANGED    Details   sildenafil (VIAGRA) 100 MG tablet Take 1 tablet by mouth as needed for Erectile DysfunctionHistorical Med           Discharge Medication List as of 6/7/2024  1:37 PM        STOP taking these medications       doxycycline hyclate (VIBRA-TABS) 100 MG tablet Comments:   Reason for Stopping:         cephALEXin (KEFLEX) 500 MG capsule Comments:   Reason for Stopping:               Labs: For convenience and continuity at follow-up the following most recent labs are provided:    Lab Results   Component Value Date/Time    WBC 11.3 06/07/2024 04:55 AM    HGB 14.8 06/07/2024 04:55 AM    HCT 42.5 06/07/2024 04:55 AM    MCV 93.2 06/07/2024 04:55 AM     06/07/2024 04:55 AM     06/07/2024 04:55 AM    K 3.7 06/07/2024 04:55 AM     06/07/2024 04:55 AM    CO2 23 06/07/2024 04:55 AM    BUN 18 06/07/2024 04:55 AM    CREATININE 0.8 06/07/2024 04:55 AM    CALCIUM 9.7 06/07/2024 04:55 AM    ALKPHOS 71 06/05/2024 09:28 AM    ALT 24 06/05/2024 09:28 AM    AST 23 06/05/2024 09:28 AM    BILITOT 0.5 06/05/2024 09:28 AM     Lab Results   Component Value Date    INR 0.95 06/05/2024       Radiology:  CT CHEST PULMONARY EMBOLISM W CONTRAST    Result Date: 6/5/2024  EXAMINATION: CTA OF THE CHEST 6/5/2024 11:03 am TECHNIQUE: CTA of the chest was performed after the administration of intravenous contrast.  Multiplanar reformatted images are provided for review.  MIP images are provided for review. Automated exposure control, iterative reconstruction, and/or weight based adjustment of the mA/kV was utilized to reduce the radiation dose to as low as reasonably achievable. COMPARISON: None. HISTORY: ORDERING SYSTEM PROVIDED  HISTORY: cough sputum hypoxia but clear CXR, poss pna vs PE TECHNOLOGIST PROVIDED HISTORY: Reason for exam:->cough sputum hypoxia but clear CXR, poss pna vs PE Additional Contrast?->1 Reason for Exam: cough sputum hypoxia but clear CXR, poss pna vs PE FINDINGS: Pulmonary Arteries: Pulmonary arteries are adequately opacified for evaluation.  No evidence of intraluminal filling defect to suggest pulmonary embolism.  Main pulmonary artery is normal in caliber. Mediastinum: No evidence of mediastinal lymphadenopathy.  The heart and pericardium demonstrate no acute abnormality.  There is no acute abnormality of the thoracic aorta. Lungs/pleura: The lungs are without acute process.  No focal consolidation or pulmonary edema.  No evidence of pleural effusion or pneumothorax. Upper Abdomen: Limited images of the upper abdomen are unremarkable. Soft Tissues/Bones: No acute bone or soft tissue abnormality.     No evidence of pulmonary embolism or acute pulmonary abnormality.     XR CHEST PORTABLE    Result Date: 6/5/2024  EXAMINATION: ONE XRAY VIEW OF THE CHEST 6/5/2024 9:24 am COMPARISON: None. HISTORY: ORDERING SYSTEM PROVIDED HISTORY: cough sputum hypoxia suspect pna TECHNOLOGIST PROVIDED HISTORY: Reason for exam:->cough sputum hypoxia suspect pna Reason for Exam: Cough sputum, hypoxia, suspect pna. FINDINGS: The cardiomediastinal silhouette is normal in size. The lungs are clear. No pleural effusion or pneumothorax is present.     No acute cardiopulmonary process.         Signed:    Elisa Rinaldi MD   6/7/2024

## 2024-06-07 NOTE — PROGRESS NOTES
Patient discharged with belongings, new medications, and follow up instructions. IV removed without complications. Patient transported to main entrance via wheelchair, family awaiting at [facility].

## 2024-06-07 NOTE — PROGRESS NOTES
Shift assessment done. All night time medications given per MAR. All fall precautions implemented. All needs attended. Electronically signed by Gogo Hardy RN on 6/6/2024 at 9:04 PM

## 2024-06-09 LAB
BACTERIA BLD CULT ORG #2: NORMAL
BACTERIA BLD CULT: NORMAL